# Patient Record
Sex: FEMALE | Race: WHITE | Employment: FULL TIME | URBAN - METROPOLITAN AREA
[De-identification: names, ages, dates, MRNs, and addresses within clinical notes are randomized per-mention and may not be internally consistent; named-entity substitution may affect disease eponyms.]

---

## 2018-03-23 ENCOUNTER — HOSPITAL ENCOUNTER (EMERGENCY)
Facility: HOSPITAL | Age: 38
Discharge: HOME | End: 2018-03-23
Payer: COMMERCIAL

## 2018-03-23 VITALS
DIASTOLIC BLOOD PRESSURE: 82 MMHG | BODY MASS INDEX: 23.6 KG/M2 | WEIGHT: 125 LBS | SYSTOLIC BLOOD PRESSURE: 117 MMHG | RESPIRATION RATE: 16 BRPM | HEART RATE: 74 BPM | HEIGHT: 61 IN | TEMPERATURE: 98 F | OXYGEN SATURATION: 100 %

## 2018-03-23 DIAGNOSIS — K64.9 HEMORRHOIDS, UNSPECIFIED HEMORRHOID TYPE: Primary | ICD-10-CM

## 2018-03-23 PROCEDURE — 99283 EMERGENCY DEPT VISIT LOW MDM: CPT

## 2018-03-23 RX ORDER — CLONAZEPAM 0.5 MG/1
0.5 TABLET ORAL 2 TIMES DAILY PRN
COMMUNITY
End: 2019-07-25

## 2018-03-23 RX ORDER — HYDROCODONE BITARTRATE AND ACETAMINOPHEN 5; 325 MG/1; MG/1
1 TABLET ORAL ONCE
Status: COMPLETED | OUTPATIENT
Start: 2018-03-23 | End: 2018-03-23

## 2018-03-23 RX ORDER — SERTRALINE HYDROCHLORIDE 100 MG/1
100 TABLET, FILM COATED ORAL DAILY
COMMUNITY
End: 2020-01-06

## 2018-03-23 RX ORDER — DOCUSATE SODIUM 100 MG/1
100 CAPSULE, LIQUID FILLED ORAL 2 TIMES DAILY
Qty: 60 CAPSULE | Refills: 0 | Status: SHIPPED | OUTPATIENT
Start: 2018-03-23 | End: 2018-04-22

## 2018-03-23 NOTE — ED PROVIDER NOTES
Patient Seen in: [facility] AND [facility] Emergency Department    History   Patient presents with:  Anal Problem (GI)    Stated Complaint: abdominal pain    HPI    42year old female presents to the emergency department complaining of constipation and rectal pa alert and oriented to person, place, and time. Skin: No rash noted. Nursing note and vitals reviewed.           ED Course   Labs Reviewed - No data to display    ED Course as of Mar 23 1438  ------------------------------------------------------------

## 2019-07-25 PROBLEM — F41.9 ANXIETY: Status: ACTIVE | Noted: 2019-07-25

## 2019-07-25 NOTE — PROGRESS NOTES
HPI    Patient presents to establish care. Wants to discuss options for refilling her anxiety medications. Currently sees a psychiatrist [address] a way and hoping to find someone closer to home. Takes sertraline and clonazepam as needed.   Only goes in Activity      Alcohol use:  Yes        Alcohol/week: 1.0 - 2.0 standard drinks        Types: 1 - 2 Glasses of wine per week        Frequency: Monthly or less        Drinks per session: 1 or 2        Binge frequency: Less than monthly      Drug use: Never adenopathy. Neurological: She is alert and oriented to person, place, and time. Skin: Skin is warm and dry. Psychiatric: She has a normal mood and affect.  Her behavior is normal. Judgment and thought content normal.       Assessment and Plan:  Proble

## 2019-07-31 ENCOUNTER — OFFICE VISIT (OUTPATIENT)
Dept: FAMILY MEDICINE CLINIC | Facility: CLINIC | Age: 39
End: 2019-07-31
Payer: COMMERCIAL

## 2019-07-31 ENCOUNTER — LAB ENCOUNTER (OUTPATIENT)
Dept: LAB | Age: 39
End: 2019-07-31
Attending: NURSE PRACTITIONER
Payer: COMMERCIAL

## 2019-07-31 ENCOUNTER — TELEPHONE (OUTPATIENT)
Dept: FAMILY MEDICINE CLINIC | Facility: CLINIC | Age: 39
End: 2019-07-31

## 2019-07-31 VITALS
SYSTOLIC BLOOD PRESSURE: 108 MMHG | TEMPERATURE: 99 F | HEART RATE: 77 BPM | BODY MASS INDEX: 27.23 KG/M2 | DIASTOLIC BLOOD PRESSURE: 82 MMHG | WEIGHT: 148 LBS | HEIGHT: 62 IN

## 2019-07-31 DIAGNOSIS — Z86.19 HISTORY OF INFECTION DUE TO HUMAN PAPILLOMA VIRUS (HPV): ICD-10-CM

## 2019-07-31 DIAGNOSIS — Z12.39 SCREENING FOR BREAST CANCER: ICD-10-CM

## 2019-07-31 DIAGNOSIS — Z01.419 WELL WOMAN EXAM WITH ROUTINE GYNECOLOGICAL EXAM: Primary | ICD-10-CM

## 2019-07-31 DIAGNOSIS — Z98.82 HISTORY OF BILATERAL BREAST IMPLANTS: ICD-10-CM

## 2019-07-31 DIAGNOSIS — Z01.419 WELL WOMAN EXAM WITH ROUTINE GYNECOLOGICAL EXAM: ICD-10-CM

## 2019-07-31 DIAGNOSIS — K59.09 OTHER CONSTIPATION: ICD-10-CM

## 2019-07-31 DIAGNOSIS — Z23 NEED FOR VACCINATION: ICD-10-CM

## 2019-07-31 LAB
25(OH)D3 SERPL-MCNC: 16.5 NG/ML (ref 30–100)
ALBUMIN SERPL-MCNC: 3.9 G/DL (ref 3.4–5)
ALBUMIN/GLOB SERPL: 1 {RATIO} (ref 1–2)
ALP LIVER SERPL-CCNC: 50 U/L (ref 37–98)
ALT SERPL-CCNC: 16 U/L (ref 13–56)
ANION GAP SERPL CALC-SCNC: 6 MMOL/L (ref 0–18)
AST SERPL-CCNC: 14 U/L (ref 15–37)
BASOPHILS # BLD AUTO: 0.04 X10(3) UL (ref 0–0.2)
BASOPHILS NFR BLD AUTO: 0.6 %
BILIRUB SERPL-MCNC: 0.4 MG/DL (ref 0.1–2)
BUN BLD-MCNC: 17 MG/DL (ref 7–18)
BUN/CREAT SERPL: 29.3 (ref 10–20)
CALCIUM BLD-MCNC: 9.3 MG/DL (ref 8.5–10.1)
CHLORIDE SERPL-SCNC: 106 MMOL/L (ref 98–112)
CHOLEST SMN-MCNC: 231 MG/DL (ref ?–200)
CO2 SERPL-SCNC: 27 MMOL/L (ref 21–32)
CREAT BLD-MCNC: 0.58 MG/DL (ref 0.55–1.02)
DEPRECATED RDW RBC AUTO: 43 FL (ref 35.1–46.3)
EOSINOPHIL # BLD AUTO: 0.22 X10(3) UL (ref 0–0.7)
EOSINOPHIL NFR BLD AUTO: 3.5 %
ERYTHROCYTE [DISTWIDTH] IN BLOOD BY AUTOMATED COUNT: 13.8 % (ref 11–15)
GLOBULIN PLAS-MCNC: 3.9 G/DL (ref 2.8–4.4)
GLUCOSE BLD-MCNC: 82 MG/DL (ref 70–99)
HCT VFR BLD AUTO: 39.8 % (ref 35–48)
HDLC SERPL-MCNC: 59 MG/DL (ref 40–59)
HGB BLD-MCNC: 13.1 G/DL (ref 12–16)
HPV I/H RISK 1 DNA SPEC QL NAA+PROBE: NEGATIVE
IMM GRANULOCYTES # BLD AUTO: 0.02 X10(3) UL (ref 0–1)
IMM GRANULOCYTES NFR BLD: 0.3 %
LDLC SERPL CALC-MCNC: 138 MG/DL (ref ?–100)
LYMPHOCYTES # BLD AUTO: 2.31 X10(3) UL (ref 1–4)
LYMPHOCYTES NFR BLD AUTO: 36.6 %
M PROTEIN MFR SERPL ELPH: 7.8 G/DL (ref 6.4–8.2)
MCH RBC QN AUTO: 28.1 PG (ref 26–34)
MCHC RBC AUTO-ENTMCNC: 32.9 G/DL (ref 31–37)
MCV RBC AUTO: 85.4 FL (ref 80–100)
MONOCYTES # BLD AUTO: 0.46 X10(3) UL (ref 0.1–1)
MONOCYTES NFR BLD AUTO: 7.3 %
NEUTROPHILS # BLD AUTO: 3.27 X10 (3) UL (ref 1.5–7.7)
NEUTROPHILS # BLD AUTO: 3.27 X10(3) UL (ref 1.5–7.7)
NEUTROPHILS NFR BLD AUTO: 51.7 %
NONHDLC SERPL-MCNC: 172 MG/DL (ref ?–130)
OSMOLALITY SERPL CALC.SUM OF ELEC: 289 MOSM/KG (ref 275–295)
PATIENT FASTING: YES
PATIENT FASTING: YES
PLATELET # BLD AUTO: 314 10(3)UL (ref 150–450)
POTASSIUM SERPL-SCNC: 4.2 MMOL/L (ref 3.5–5.1)
RBC # BLD AUTO: 4.66 X10(6)UL (ref 3.8–5.3)
SODIUM SERPL-SCNC: 139 MMOL/L (ref 136–145)
TRIGL SERPL-MCNC: 171 MG/DL (ref 30–149)
TSI SER-ACNC: 1.69 MIU/ML (ref 0.36–3.74)
VIT B12 SERPL-MCNC: 577 PG/ML (ref 193–986)
VLDLC SERPL CALC-MCNC: 34 MG/DL (ref 0–30)
WBC # BLD AUTO: 6.3 X10(3) UL (ref 4–11)

## 2019-07-31 PROCEDURE — 36415 COLL VENOUS BLD VENIPUNCTURE: CPT

## 2019-07-31 PROCEDURE — 90715 TDAP VACCINE 7 YRS/> IM: CPT | Performed by: NURSE PRACTITIONER

## 2019-07-31 PROCEDURE — 90471 IMMUNIZATION ADMIN: CPT | Performed by: NURSE PRACTITIONER

## 2019-07-31 PROCEDURE — 85025 COMPLETE CBC W/AUTO DIFF WBC: CPT

## 2019-07-31 PROCEDURE — 80061 LIPID PANEL: CPT

## 2019-07-31 PROCEDURE — 80053 COMPREHEN METABOLIC PANEL: CPT

## 2019-07-31 PROCEDURE — 84443 ASSAY THYROID STIM HORMONE: CPT

## 2019-07-31 PROCEDURE — 82607 VITAMIN B-12: CPT

## 2019-07-31 PROCEDURE — 82306 VITAMIN D 25 HYDROXY: CPT

## 2019-07-31 PROCEDURE — 99396 PREV VISIT EST AGE 40-64: CPT | Performed by: NURSE PRACTITIONER

## 2019-07-31 NOTE — PROGRESS NOTES
HPI    Patient presents for annual physical.  No complaints of health. No significant past medical history. Last pap - 3 years ago. Was abnormal.  Had colposcopy which was abnormal and then had scraping of cells after. Positive for history of HPV. curr  2016   • Design custom breast implant  2009       Family History   Problem Relation Age of Onset   • Stroke Father    • Other (Other) Mother    • Heart Disorder Maternal Grandmother    • Other (Other) Maternal Grandfather        Social History    Soc MG Oral Tab Take 1 tablet (0.5 mg total) by mouth nightly as needed for Anxiety. Disp: 30 tablet Rfl: 0   Sertraline HCl 100 MG Oral Tab Take 100 mg by mouth daily.  Disp:  Rfl:        Allergies:  No Known Allergies    Physical Exam   Nursing note and vital with pap/hpv, vaginal culture.          Relevant Orders    LIPID PANEL    COMP METABOLIC PANEL (14)    CBC WITH DIFFERENTIAL WITH PLATELET    VITAMIN M94    VITAMIN D, 25-HYDROXY    TSH W REFLEX TO FREE T4    THINPREP PAP SMEAR B    HPV HIGH RISK , THIN PRE

## 2019-07-31 NOTE — ASSESSMENT & PLAN NOTE
General labs with pap/hpv, vaginal culture. Discussed IUD with patient. To consider Mirena IUD and return.

## 2019-07-31 NOTE — TELEPHONE ENCOUNTER
1st -  her pap smear needs to be normal to proceed so needs that resulted before can have IUD placed. Please find out which kind she wants - Mirena -5 years with hormones vs paraguard - 10 years no hormones but heavier menses usually.  Should scheduled wh

## 2019-07-31 NOTE — TELEPHONE ENCOUNTER
Pt is interested in IUD placement with Dr Yolanda Tony, she is requesting instructions prior to getting IUD placement done.  Please advise

## 2019-07-31 NOTE — PATIENT INSTRUCTIONS
Treating Constipation    Constipation is a common and often uncomfortable problem. Constipation means you have bowel movements fewer than 3 times per week, or strain to pass hard, dry stool. It can last a short time.  Or it can be a problem that never see © 6581-2219 [address] [facility], [address] [place]. All rights reserved. This information is not intended as a substitute for professional medical care. Always follow your healthcare professional's instructions.         Prevent Cervical cancer All women in this age group, except women who have had a complete hysterectomy Pap test every 3 years or Pap test plus human papilloma virus (HPV) test every 5 years   Chlamydia Women at increased risk for infection At routine exams if you' Haemophilus influenzae Type B (HIB) Women at increased risk 1 to 3 doses   Influenza (flu) All women in this age group Once a year   Measles, mumps, rubella (MMR) All women in this age group who have no record of these infections or vaccines 1 or 2 doses

## 2019-08-01 DIAGNOSIS — E78.2 ELEVATED TRIGLYCERIDES WITH HIGH CHOLESTEROL: ICD-10-CM

## 2019-08-01 DIAGNOSIS — E55.9 VITAMIN D DEFICIENCY: Primary | ICD-10-CM

## 2019-08-01 RX ORDER — CHOLECALCIFEROL (VITAMIN D3) 50 MCG
1 TABLET ORAL DAILY
Qty: 1 TABLET | Refills: 0 | COMMUNITY
Start: 2019-08-01 | End: 2019-08-02

## 2019-08-01 NOTE — TELEPHONE ENCOUNTER
Patient called back and was informed. She will call us back once she decides. Per patient request the message was sent via [employer] for the patient to reread.

## 2019-08-03 LAB
GENITAL VAGINOSIS SCREEN: NEGATIVE
TRICHOMONAS SCREEN: NEGATIVE

## 2019-08-12 ENCOUNTER — TELEPHONE (OUTPATIENT)
Dept: FAMILY MEDICINE CLINIC | Facility: CLINIC | Age: 39
End: 2019-08-12

## 2019-08-12 NOTE — TELEPHONE ENCOUNTER
Pt returned call. Pt states she first noticed the area of redness on Monday 8/5/19, pt received TDaP on 7/31/19. The area of redness is about the size of a dime and is gradually getting bigger, now is a raised area that is noticeable under her clothing.  St

## 2019-09-24 NOTE — TELEPHONE ENCOUNTER
Controlled medication pending for review. If approved needs to be called in or faxed by on-site staff.     Requested Prescriptions     Pending Prescriptions Disp Refills   • clonazePAM 0.5 MG Oral Tab 30 tablet 0     Sig: Take 1 tablet (0.5 mg total) by mo

## 2019-09-25 RX ORDER — CLONAZEPAM 0.5 MG/1
0.5 TABLET ORAL NIGHTLY PRN
Qty: 30 TABLET | Refills: 0 | Status: SHIPPED
Start: 2019-09-25 | End: 2019-12-09

## 2019-09-25 NOTE — TELEPHONE ENCOUNTER
[address] [phone], spoke with pharmacist to dispense medication  Clonazepam 0.5 MG  To disp 30 tablets  With no refills  Directions: take one tablet by mouth nightly as needed for anxiety    Called patient left voicemail stating medication c

## 2019-12-10 RX ORDER — CLONAZEPAM 0.5 MG/1
0.5 TABLET ORAL NIGHTLY PRN
Qty: 30 TABLET | Refills: 0 | Status: SHIPPED | OUTPATIENT
Start: 2019-12-10 | End: 2020-02-20

## 2019-12-10 NOTE — TELEPHONE ENCOUNTER
Controlled medication pending for review. Please change to phone in, fax, or print script if not being sent electronically.     Requested Prescriptions     Pending Prescriptions Disp Refills   • clonazePAM 0.5 MG Oral Tab 30 tablet 0     Sig: Take 1 tablet

## 2020-01-06 ENCOUNTER — PATIENT MESSAGE (OUTPATIENT)
Dept: FAMILY MEDICINE CLINIC | Facility: CLINIC | Age: 40
End: 2020-01-06

## 2020-01-06 NOTE — TELEPHONE ENCOUNTER
From: Riky Linder  To: TIFFANIE Douglas  Sent: 1/6/2020 10:42 AM CST  Subject: Prescription Question    I am a new patient of Quiana Verde. She said that she will fill my Sertaline 100mg when I need it.  Can you have her submit a prescription for me

## 2020-01-07 RX ORDER — SERTRALINE HYDROCHLORIDE 100 MG/1
100 TABLET, FILM COATED ORAL DAILY
Qty: 90 TABLET | Refills: 1 | Status: SHIPPED | OUTPATIENT
Start: 2020-01-07 | End: 2020-08-10

## 2020-02-20 RX ORDER — CLONAZEPAM 0.5 MG/1
0.5 TABLET ORAL NIGHTLY PRN
Qty: 30 TABLET | Refills: 0 | Status: SHIPPED | OUTPATIENT
Start: 2020-02-20 | End: 2020-05-26

## 2020-02-20 NOTE — TELEPHONE ENCOUNTER
•  clonazePAM 0.5 MG Oral Tab, Take 1 tablet (0.5 mg total) by mouth nightly as needed for Anxiety. , Disp: 30 tablet, Rfl: 0

## 2020-02-20 NOTE — TELEPHONE ENCOUNTER
Controlled medication pending for review. Please change to phone in, fax, or print script if not being sent electronically.     Last Rx: 12/10/19  LOV: 07/31/19

## 2020-05-26 RX ORDER — CLONAZEPAM 0.5 MG/1
0.5 TABLET ORAL NIGHTLY PRN
Qty: 30 TABLET | Refills: 0 | Status: SHIPPED | OUTPATIENT
Start: 2020-05-26 | End: 2020-08-10

## 2020-08-10 RX ORDER — CLONAZEPAM 0.5 MG/1
0.5 TABLET ORAL NIGHTLY PRN
Qty: 10 TABLET | Refills: 0 | Status: SHIPPED | OUTPATIENT
Start: 2020-08-10 | End: 2020-09-18

## 2020-08-10 RX ORDER — SERTRALINE HYDROCHLORIDE 100 MG/1
100 TABLET, FILM COATED ORAL DAILY
Qty: 30 TABLET | Refills: 0 | Status: SHIPPED | OUTPATIENT
Start: 2020-08-10 | End: 2020-09-18

## 2020-08-31 RX ORDER — SERTRALINE HYDROCHLORIDE 100 MG/1
100 TABLET, FILM COATED ORAL DAILY
Qty: 30 TABLET | Refills: 0 | OUTPATIENT
Start: 2020-08-31

## 2020-08-31 RX ORDER — CLONAZEPAM 0.5 MG/1
0.5 TABLET ORAL NIGHTLY PRN
Qty: 10 TABLET | Refills: 0 | OUTPATIENT
Start: 2020-08-31

## 2020-09-02 RX ORDER — CLONAZEPAM 0.5 MG/1
TABLET ORAL
Qty: 10 TABLET | Refills: 0 | OUTPATIENT
Start: 2020-09-02

## 2020-09-18 NOTE — PROGRESS NOTES
HPI    Patient presents for anxiety follow up. Well controlled with sertraline and clonazepam as needed. Review of Systems   Psychiatric/Behavioral: Negative for suicidal ideas and self-injury. The patient is nervous/anxious.     All other systems re Frequency: Monthly or less        Drinks per session: 1 or 2        Binge frequency: Less than monthly      Drug use: Never      Sexual activity: Not on file    Lifestyle      Physical activity:        Days per week: Not on file        Minutes per session: Plan:  Problem List Items Addressed This Visit        Other    Anxiety - Primary    Relevant Medications    clonazePAM 0.5 MG Oral Tab    Sertraline HCl 100 MG Oral Tab      Discussed plan of care with patient and patient is in agreement.   All questions an

## 2020-12-10 DIAGNOSIS — F41.9 ANXIETY: ICD-10-CM

## 2020-12-10 RX ORDER — CLONAZEPAM 0.5 MG/1
TABLET ORAL
Qty: 30 TABLET | Refills: 0 | Status: SHIPPED | OUTPATIENT
Start: 2020-12-10 | End: 2021-03-09

## 2021-03-02 ENCOUNTER — TELEPHONE (OUTPATIENT)
Dept: FAMILY MEDICINE CLINIC | Facility: CLINIC | Age: 41
End: 2021-03-02

## 2021-03-06 DIAGNOSIS — F41.9 ANXIETY: ICD-10-CM

## 2021-03-09 DIAGNOSIS — F41.9 ANXIETY: ICD-10-CM

## 2021-03-09 RX ORDER — CLONAZEPAM 0.5 MG/1
0.5 TABLET ORAL NIGHTLY PRN
Qty: 30 TABLET | Refills: 0 | OUTPATIENT
Start: 2021-03-09

## 2021-03-09 RX ORDER — CLONAZEPAM 0.5 MG/1
0.5 TABLET ORAL NIGHTLY PRN
Qty: 30 TABLET | Refills: 0 | Status: SHIPPED | OUTPATIENT
Start: 2021-03-09 | End: 2021-05-17

## 2021-03-13 DIAGNOSIS — Z12.31 ENCOUNTER FOR SCREENING MAMMOGRAM FOR MALIGNANT NEOPLASM OF BREAST: Primary | ICD-10-CM

## 2021-04-20 DIAGNOSIS — F41.9 ANXIETY: ICD-10-CM

## 2021-04-20 RX ORDER — SERTRALINE HYDROCHLORIDE 100 MG/1
TABLET, FILM COATED ORAL
Qty: 90 TABLET | Refills: 1 | Status: SHIPPED | OUTPATIENT
Start: 2021-04-20 | End: 2021-07-22

## 2021-05-16 DIAGNOSIS — F41.9 ANXIETY: ICD-10-CM

## 2021-05-17 DIAGNOSIS — F41.9 ANXIETY: ICD-10-CM

## 2021-05-17 RX ORDER — CLONAZEPAM 0.5 MG/1
0.5 TABLET ORAL NIGHTLY PRN
Qty: 30 TABLET | Refills: 0 | Status: SHIPPED | OUTPATIENT
Start: 2021-05-17 | End: 2021-07-22

## 2021-05-18 RX ORDER — CLONAZEPAM 0.5 MG/1
0.5 TABLET ORAL NIGHTLY PRN
Qty: 30 TABLET | Refills: 0 | OUTPATIENT
Start: 2021-05-18

## 2021-07-22 NOTE — PROGRESS NOTES
HPI    Patient presents for anxiety follow up and medication refills. Well controlled with daily sertraline and as needed clonazepam.  Also with concerns of large cold sore to upper lip x 3 days. Review of Systems   HENT: Positive for mouth sores. activity: Not on file    Other Topics      Concerns:        Not on file    Social History Narrative      Not on file    Social Determinants of Health  Financial Resource Strain:       Difficulty of Paying Living Expenses:   Food Insecurity:       Worried A No wheezing, rhonchi or rales. Chest:      Chest wall: No tenderness. Skin:     General: Skin is warm and dry. Neurological:      Mental Status: She is alert and oriented to person, place, and time.    Psychiatric:         Mood and Affect: Mood normal

## 2021-11-06 DIAGNOSIS — F41.9 ANXIETY: ICD-10-CM

## 2021-11-06 RX ORDER — CLONAZEPAM 0.5 MG/1
0.5 TABLET ORAL NIGHTLY PRN
Qty: 30 TABLET | Refills: 0 | Status: SHIPPED | OUTPATIENT
Start: 2021-11-06 | End: 2022-01-10

## 2021-11-06 NOTE — TELEPHONE ENCOUNTER
Please review; protocol failed/no protocol    Requested Prescriptions   Pending Prescriptions Disp Refills    CLONAZEPAM 0.5 MG Oral Tab [Pharmacy Med Name: CLONAZEPAM 0.5 MG TABLET] 30 tablet 0     Sig: TAKE 1 TABLET BY MOUTH NIGHTLY AS NEEDED FOR ANXIETY

## 2022-01-10 DIAGNOSIS — B00.1 HERPES LABIALIS: ICD-10-CM

## 2022-01-10 DIAGNOSIS — F41.9 ANXIETY: ICD-10-CM

## 2022-01-11 RX ORDER — VALACYCLOVIR HYDROCHLORIDE 1 G/1
TABLET, FILM COATED ORAL
Qty: 6 TABLET | Refills: 2 | Status: SHIPPED | OUTPATIENT
Start: 2022-01-11

## 2022-01-11 RX ORDER — CLONAZEPAM 0.5 MG/1
0.5 TABLET ORAL NIGHTLY PRN
Qty: 30 TABLET | Refills: 0 | Status: SHIPPED | OUTPATIENT
Start: 2022-01-11

## 2022-03-02 ENCOUNTER — HOSPITAL ENCOUNTER (OUTPATIENT)
Dept: MAMMOGRAPHY | Age: 42
Discharge: HOME | End: 2022-03-02
Attending: NURSE PRACTITIONER
Payer: COMMERCIAL

## 2022-03-02 DIAGNOSIS — Z12.31 ENCOUNTER FOR SCREENING MAMMOGRAM FOR MALIGNANT NEOPLASM OF BREAST: ICD-10-CM

## 2022-03-02 PROCEDURE — 77067 SCR MAMMO BI INCL CAD: CPT | Performed by: NURSE PRACTITIONER

## 2022-03-02 PROCEDURE — 77063 BREAST TOMOSYNTHESIS BI: CPT | Performed by: NURSE PRACTITIONER

## 2022-04-20 ENCOUNTER — HOSPITAL ENCOUNTER (OUTPATIENT)
Dept: MAMMOGRAPHY | Facility: HOSPITAL | Age: 42
Discharge: HOME | End: 2022-04-20
Attending: NURSE PRACTITIONER
Payer: COMMERCIAL

## 2022-04-20 DIAGNOSIS — R92.2 INCONCLUSIVE MAMMOGRAM: ICD-10-CM

## 2022-04-20 PROCEDURE — 77065 DX MAMMO INCL CAD UNI: CPT | Performed by: NURSE PRACTITIONER

## 2022-04-20 PROCEDURE — 77061 BREAST TOMOSYNTHESIS UNI: CPT | Performed by: NURSE PRACTITIONER

## 2022-04-29 RX ORDER — SERTRALINE HYDROCHLORIDE 100 MG/1
100 TABLET, FILM COATED ORAL DAILY
Qty: 90 TABLET | Refills: 0 | Status: SHIPPED | OUTPATIENT
Start: 2022-04-29

## 2022-04-29 NOTE — TELEPHONE ENCOUNTER
90 day refill given on 04/29/22, appointment needed for further refills. Refill passed per [facility] protocol. Requested Prescriptions   Pending Prescriptions Disp Refills    sertraline 100 MG Oral Tab 90 tablet 1     Sig: Take 1 tablet (100 mg total) by mouth daily. Psychiatric Non-Scheduled (Anti-Anxiety) Failed - 4/29/2022  3:28 PM        Failed - Appointment in last 6 or next 3 months           valACYclovir 1 G Oral Tab 6 tablet 2     Sig: Take one tablet now and another in 12 hours with cold sore flare up. There is no refill protocol information for this order        clonazePAM 0.5 MG Oral Tab 30 tablet 0     Sig: Take 1 tablet (0.5 mg total) by mouth nightly as needed for Anxiety.         There is no refill protocol information for this order           Recent Outpatient Visits              9 months ago Elly [address], [facility], [address] [facility], APRN    Office Visit    1 year ago [facility], [facility] [address] [facility], [address]    Office Visit    2 years ago Well woman exam with routine gynecological exam    Lauren Hernandez, [address] [facility], APRN    Office Visit    2 years ago [address], 1 [facility] Lana Jenkins, [address]    Office Visit

## 2022-04-29 NOTE — TELEPHONE ENCOUNTER
Please review. Protocol Failed or has no Protocol  Requested Prescriptions   Pending Prescriptions Disp Refills    valACYclovir 1 G Oral Tab 6 tablet 2     Sig: Take one tablet now and another in 12 hours with cold sore flare up. There is no refill protocol information for this order        clonazePAM 0.5 MG Oral Tab 30 tablet 0     Sig: Take 1 tablet (0.5 mg total) by mouth nightly as needed for Anxiety. There is no refill protocol information for this order       Signed Prescriptions Disp Refills    sertraline 100 MG Oral Tab 90 tablet 0     Sig: Take 1 tablet (100 mg total) by mouth daily. 90 day refill given on 04/29/22, appointment needed for further refills.         Psychiatric Non-Scheduled (Anti-Anxiety) Failed - 4/29/2022  3:28 PM        Failed - Appointment in last 6 or next 3 months              Recent Outpatient Visits              9 months ago [address], [facility], [facility], APRN    Office Visit    1 year ago Elly [address], [address], [address] [facility], [address]    Office Visit    2 years ago Well woman exam with routine gynecological exam    [address], [address] [facility], APRN    Office Visit    2 years ago [address], [address] [place] [address] Lana Jenkins, [address]    Office Visit

## 2022-05-01 RX ORDER — CLONAZEPAM 0.5 MG/1
0.5 TABLET ORAL NIGHTLY PRN
Qty: 30 TABLET | Refills: 0 | Status: SHIPPED | OUTPATIENT
Start: 2022-05-01

## 2022-05-01 RX ORDER — VALACYCLOVIR HYDROCHLORIDE 1 G/1
TABLET, FILM COATED ORAL
Qty: 6 TABLET | Refills: 2 | Status: SHIPPED | OUTPATIENT
Start: 2022-05-01

## 2022-05-02 ENCOUNTER — HOSPITAL ENCOUNTER (OUTPATIENT)
Age: 42
Discharge: HOME | End: 2022-05-02
Payer: COMMERCIAL

## 2022-05-02 VITALS
OXYGEN SATURATION: 98 % | WEIGHT: 140 LBS | HEART RATE: 77 BPM | TEMPERATURE: 99 F | HEIGHT: 61 IN | BODY MASS INDEX: 26.43 KG/M2 | DIASTOLIC BLOOD PRESSURE: 62 MMHG | SYSTOLIC BLOOD PRESSURE: 107 MMHG | RESPIRATION RATE: 16 BRPM

## 2022-05-02 DIAGNOSIS — U07.1 COVID-19: Primary | ICD-10-CM

## 2022-05-02 LAB
POCT INFLUENZA A: NEGATIVE
POCT INFLUENZA B: NEGATIVE
SARS-COV-2 RNA RESP QL NAA+PROBE: DETECTED

## 2022-05-02 PROCEDURE — U0002 COVID-19 LAB TEST NON-CDC: HCPCS | Performed by: NURSE PRACTITIONER

## 2022-05-02 PROCEDURE — 87502 INFLUENZA DNA AMP PROBE: CPT | Performed by: NURSE PRACTITIONER

## 2022-05-02 PROCEDURE — M0222 INTRAVENOUS INJECTION, BEBTELOVIMAB, INCLUDES INJECTION AND POST ADMINISTRATIVE MONITORING: HCPCS | Performed by: NURSE PRACTITIONER

## 2022-05-02 PROCEDURE — 99214 OFFICE O/P EST MOD 30 MIN: CPT | Performed by: NURSE PRACTITIONER

## 2022-05-02 RX ORDER — BEBTELOVIMAB 87.5 MG/ML
175 INJECTION, SOLUTION INTRAVENOUS ONCE
Status: COMPLETED | OUTPATIENT
Start: 2022-05-02 | End: 2022-05-02

## 2022-05-02 NOTE — ED INITIAL ASSESSMENT (HPI)
Patient presents to IC with c/o body aches, headache, nasal congestion beginning Saturday night. Denies fevers, cough or sore throat. No hx of covid in past 3 months.

## 2022-07-31 DIAGNOSIS — F41.9 ANXIETY: ICD-10-CM

## 2022-08-02 RX ORDER — SERTRALINE HYDROCHLORIDE 100 MG/1
TABLET, FILM COATED ORAL
Qty: 90 TABLET | Refills: 0 | OUTPATIENT
Start: 2022-08-02

## 2022-09-20 NOTE — TELEPHONE ENCOUNTER
----- Message from [facility] sent at 8/12/2019 10:19 AM CDT -----  Regarding: Non-Urgent Medical Question  Contact: [phone]  I received a tetnus shot on 7/31 and the injection site is red and raised. Only painful if touched.  No puss, no bleed Dr. Hernandez

## 2022-10-14 DIAGNOSIS — F41.9 ANXIETY: ICD-10-CM

## 2022-10-14 DIAGNOSIS — B00.1 HERPES LABIALIS: ICD-10-CM

## 2022-10-14 RX ORDER — SERTRALINE HYDROCHLORIDE 100 MG/1
100 TABLET, FILM COATED ORAL DAILY
Qty: 90 TABLET | Refills: 1 | Status: SHIPPED | OUTPATIENT
Start: 2022-10-14 | End: 2022-10-18

## 2022-10-14 RX ORDER — VALACYCLOVIR HYDROCHLORIDE 1 G/1
TABLET, FILM COATED ORAL
Qty: 4 TABLET | Refills: 0 | Status: SHIPPED | OUTPATIENT
Start: 2022-10-14 | End: 2022-10-18

## 2022-10-14 RX ORDER — CLONAZEPAM 0.5 MG/1
0.5 TABLET ORAL NIGHTLY PRN
Qty: 30 TABLET | Refills: 0 | Status: SHIPPED | OUTPATIENT
Start: 2022-10-14 | End: 2023-03-20

## 2022-10-14 NOTE — TELEPHONE ENCOUNTER
Routed to [employer] PA for [employer] TIFFANIE for advise, thanks. New pharm updated. Refill passed per [facility] protocol   Requested Prescriptions   Pending Prescriptions Disp Refills    clonazePAM 0.5 MG Oral Tab 30 tablet 0     Sig: Take 1 tablet (0.5 mg total) by mouth nightly as needed for Anxiety. There is no refill protocol information for this order        sertraline 100 MG Oral Tab 90 tablet 1     Sig: Take 1 tablet (100 mg total) by mouth daily. Psychiatric Non-Scheduled (Anti-Anxiety) Passed - 10/14/2022  8:14 AM        Passed - In person appointment or virtual visit in the past 6 mos or appointment in next 3 mos       Recent Outpatient Visits              2 months ago [address], [address] [facility], [facility]    Office Visit    1 year ago Elly Beaulieu, [place] , 1 [facility] Izaiah Jenkins, TIFFANIE    Office Visit    2 years ago Elly Beaulieu, [address], 1 [facility] Izaiah Jenkins, [address] [place] [address] Drive    Office Visit    3 years ago Well woman exam with routine gynecological exam    [facility], [facility] Izaiah Jenkins, APRRON    Office Visit    3 years ago Elly Beaulieu, [address], [facility], [facility]    Office Visit                    valACYclovir 1 G Oral Tab 6 tablet 2     Sig: Take one tablet now and another in 12 hours with cold sore flare up.         There is no refill protocol information for this order

## 2022-10-18 DIAGNOSIS — B00.1 HERPES LABIALIS: ICD-10-CM

## 2022-10-18 DIAGNOSIS — F41.9 ANXIETY: ICD-10-CM

## 2022-10-18 RX ORDER — CLONAZEPAM 0.5 MG/1
0.5 TABLET ORAL NIGHTLY PRN
Qty: 30 TABLET | Refills: 0 | OUTPATIENT
Start: 2022-10-18

## 2022-10-18 RX ORDER — VALACYCLOVIR HYDROCHLORIDE 1 G/1
TABLET, FILM COATED ORAL
Qty: 4 TABLET | Refills: 0 | Status: SHIPPED | OUTPATIENT
Start: 2022-10-18

## 2022-10-18 NOTE — TELEPHONE ENCOUNTER
Patient requesting to new pharmacy. Cancelled at previous pharmacy. Pended for review if okay to resent to new patient preferred pharmacy.

## 2022-10-19 DIAGNOSIS — F41.9 ANXIETY: ICD-10-CM

## 2022-10-19 RX ORDER — CLONAZEPAM 0.5 MG/1
TABLET ORAL
Qty: 30 TABLET | Refills: 0 | OUTPATIENT
Start: 2022-10-19

## 2022-10-19 NOTE — TELEPHONE ENCOUNTER
Advised patient of Lory Burgess's note. Patient verbalized understanding and updated pharmacies in patient's profile.

## 2022-10-20 RX ORDER — SERTRALINE HYDROCHLORIDE 100 MG/1
100 TABLET, FILM COATED ORAL DAILY
Qty: 90 TABLET | Refills: 1 | Status: SHIPPED | OUTPATIENT
Start: 2022-10-20 | End: 2022-10-20

## 2022-10-20 RX ORDER — SERTRALINE HYDROCHLORIDE 100 MG/1
100 TABLET, FILM COATED ORAL DAILY
Qty: 90 TABLET | Refills: 1 | Status: SHIPPED | OUTPATIENT
Start: 2022-10-20

## 2023-09-18 DIAGNOSIS — F41.9 ANXIETY: ICD-10-CM

## 2023-09-18 NOTE — TELEPHONE ENCOUNTER
Per pharmacy pt is requesting refills for the following medications. clonazePAM 0.5 MG Oral Tab, Take 1 tablet (0.5 mg total) by mouth nightly as needed for Anxiety. , Disp: 30 tablet, Rfl: 0      sertraline 100 MG Oral Tab, Take 1 tablet (100 mg total) by mouth daily. , Disp: 90 tablet, Rfl: 1

## 2023-09-19 RX ORDER — CLONAZEPAM 0.5 MG/1
0.5 TABLET ORAL NIGHTLY PRN
Qty: 30 TABLET | Refills: 0 | OUTPATIENT
Start: 2023-09-19

## 2023-09-19 RX ORDER — SERTRALINE HYDROCHLORIDE 100 MG/1
100 TABLET, FILM COATED ORAL DAILY
Qty: 90 TABLET | Refills: 1 | OUTPATIENT
Start: 2023-09-19

## 2023-09-19 NOTE — TELEPHONE ENCOUNTER
Please review. Protocol failed or has no protocol. Requested Prescriptions   Pending Prescriptions Disp Refills    clonazePAM 0.5 MG Oral Tab 30 tablet 0     Sig: Take 1 tablet (0.5 mg total) by mouth nightly as needed for Anxiety. There is no refill protocol information for this order       sertraline 100 MG Oral Tab 90 tablet 1     Sig: Take 1 tablet (100 mg total) by mouth daily.        Psychiatric Non-Scheduled (Anti-Anxiety) Failed - 9/18/2023  2:37 PM        Failed - In person appointment or virtual visit in the past 6 mos or appointment in next 3 mos     Recent Outpatient Visits              1 year ago [facility], [address] [place] [address], APRN    Office Visit    2 years ago [facility], [address] [place] [address], APRN    Office Visit    3 years ago [facility], [address] [place] [address], APRN    Office Visit    4 years ago Well woman exam with routine gynecological exam    [address] [facility], 1 [facility] Lana Jenkins APRN    Office Visit    4 years ago [facility], [address] [place] [address], APRN    Office Visit                           Recent Outpatient Visits              1 year ago [facility], [address] [place] [address], APRN    Office Visit    2 years ago [address], [address], [address] [facility] Lana Jenkins, TIFFANIE    Office Visit    3 years ago [address], [address] [facility], APRN    Office Visit    4 years ago Well woman exam with routine gynecological exam    [address] [facility], 1 [facility] Lana Jenkins APRN    Office Visit    4 years ago [address], [address], 1 [facility] Lana Jenkins APRRON    Office Visit

## 2023-09-22 DIAGNOSIS — F41.9 ANXIETY: ICD-10-CM

## 2023-09-22 RX ORDER — CLONAZEPAM 0.5 MG/1
0.5 TABLET ORAL NIGHTLY PRN
Qty: 10 TABLET | Refills: 0 | Status: SHIPPED | OUTPATIENT
Start: 2023-09-22

## 2023-09-22 NOTE — TELEPHONE ENCOUNTER
Please review; protocol failed. Please see patients [address]  P Em Triage Support (supporting TIFFANIE Che)2 minutes ago (1:51 PM)       Thank you, I just scheduled my appointment for October 4th at 7:20am.  If she can refill half of it now, I would appreciate it  Please see message below for upcoming appointment. Future Appointments   Date Time Provider Jed Souza   10/4/2023  7:20 AM [facility]           Requested Prescriptions   Pending Prescriptions Disp Refills    clonazePAM 0.5 MG Oral Tab 30 tablet 0     Sig: Take 1 tablet (0.5 mg total) by mouth nightly as needed for Anxiety.        There is no refill protocol information for this order        Recent Outpatient Visits              1 year ago [address] [place] [address], [address] [place] [address], [facility]    Office Visit    2 years ago [address] [place] [address], [address] [place] [address], APRN    Office Visit    3 years ago [address] [place] [address], [address] [place] [address], APRN    Office Visit    4 years ago Well woman exam with routine gynecological exam    [facility], [address] [place] [address] Lana Jenkins APRN    Office Visit    4 years ago [address] [place] [address] Lana Jenkins, [address]    Office Visit

## 2024-01-15 DIAGNOSIS — F41.9 ANXIETY: ICD-10-CM

## 2024-01-15 NOTE — TELEPHONE ENCOUNTER
Rx pended see msg below    Pharmacy calling regarding:  clonazePAM 0.5 MG Oral Tab       Patient brought in a physical copy, this must be sent electronically

## 2024-01-15 NOTE — TELEPHONE ENCOUNTER
Pharmacy calling regarding:  clonazePAM 0.5 MG Oral Tab      Patient brought in a physical copy, this must be sent electronically

## 2024-01-17 RX ORDER — CLONAZEPAM 0.5 MG/1
0.5 TABLET ORAL NIGHTLY PRN
Qty: 30 TABLET | Refills: 0 | OUTPATIENT
Start: 2024-01-17

## 2024-01-17 NOTE — TELEPHONE ENCOUNTER
Called [facility], they can accept hard copies of clonazepam, called [facility], they are now able to accept hard copies, they said they would have it ready in 2 hours for patient pickup.  Please call patient to let her know.  Thank you     This rx request can be refused after patient is called.

## 2024-01-17 NOTE — TELEPHONE ENCOUNTER
Routed to Triage support for assistance, pls call pharm if pt still need rx ref?   Per med list printed rx given on 1-10-24 for 30 pills.  Thanks.

## 2024-04-14 DIAGNOSIS — B00.1 HERPES LABIALIS: ICD-10-CM

## 2024-04-14 DIAGNOSIS — F41.9 ANXIETY: ICD-10-CM

## 2024-04-15 RX ORDER — VALACYCLOVIR HYDROCHLORIDE 1 G/1
TABLET, FILM COATED ORAL
Qty: 4 TABLET | Refills: 1 | Status: SHIPPED | OUTPATIENT
Start: 2024-04-15

## 2024-04-15 RX ORDER — CLONAZEPAM 0.5 MG/1
0.5 TABLET ORAL NIGHTLY PRN
Qty: 30 TABLET | Refills: 0 | Status: SHIPPED | OUTPATIENT
Start: 2024-04-15

## 2024-04-15 NOTE — TELEPHONE ENCOUNTER
Please review; protocol failed/No Protocol    LOV:01/10/2024    Fill dates: 01/17/2024    Requested Prescriptions   Pending Prescriptions Disp Refills    clonazePAM 0.5 MG Oral Tab 30 tablet 0     Sig: Take 1 tablet (0.5 mg total) by mouth nightly as needed for Anxiety.       Controlled Substance Medication Failed - 4/14/2024  9:03 AM        Failed - This medication is a controlled substance - forward to provider to refill         Signed Prescriptions Disp Refills    valACYclovir 1 G Oral Tab 4 tablet 1     Sig: Take one tablet now and another in 12 hours with cold sore flare up.       Herpes Agent Protocol Passed - 4/14/2024  9:03 AM        Passed - In person appointment or virtual visit in the past 12 mos or appointment in next 3 mos     Recent Outpatient Visits              3 months ago Anxiety    [facility], [address], Tory Avina, TIFFANIE    Office Visit    1 year ago Anxiety    [facility], [address], Tory Avina, APRN    Office Visit    2 years ago Anxiety    [facility], [address], Tory Avina, APRRON    Office Visit    3 years ago Anxiety    [facility], [address], Tory Avina, APRN    Office Visit    4 years ago Well woman exam with routine gynecological exam    [facility], [address], Tory Avina, APRN    Office Visit                           Recent Outpatient Visits              3 months ago Anxiety    [facility], [address], Tory Avina, APRN    Office Visit    1 year ago Anxiety    [facility], [address], Tory Avina, APRN    Office Visit    2 years ago Anxiety    [facility], [address] APRN    Office Visit    3 years ago Anxiety    [facility], [address], APRN    Office Visit    4 years ago Well woman exam with routine  gynecological exam    [facility], [address], [place] Tory Holden, APRN    Office Visit

## 2024-04-15 NOTE — TELEPHONE ENCOUNTER
Refill passed per [facility] protocol.  Requested Prescriptions   Pending Prescriptions Disp Refills    clonazePAM 0.5 MG Oral Tab 30 tablet 0     Sig: Take 1 tablet (0.5 mg total) by mouth nightly as needed for Anxiety.       Controlled Substance Medication Failed - 4/14/2024  9:03 AM        Failed - This medication is a controlled substance - forward to provider to refill          valACYclovir 1 G Oral Tab 4 tablet 1     Sig: Take one tablet now and another in 12 hours with cold sore flare up.       Herpes Agent Protocol Passed - 4/14/2024  9:03 AM        Passed - In person appointment or virtual visit in the past 12 mos or appointment in next 3 mos     Recent Outpatient Visits              3 months ago Anxiety    [facility], [address], Tory Avina, APRN    Office Visit    1 year ago Anxiety    [facility], [address], Tory Avina, APRN    Office Visit    2 years ago Anxiety    [facility], [address], Tory Avina, APRN    Office Visit    3 years ago Anxiety    [facility], [address], Tory Avina, APRN    Office Visit    4 years ago Well woman exam with routine gynecological exam    [facility], [address], Tory Avina, APRN    Office Visit                           Recent Outpatient Visits              3 months ago Anxiety    [facility], [address], Tory Avina, APRN    Office Visit    1 year ago Anxiety    [facility], [address], Tory Avina, APRN    Office Visit    2 years ago Anxiety    [facility], [address], Tory Avina, APRN    Office Visit    3 years ago Anxiety    [facility], [address], Tory Avina, APRN    Office Visit    4 years ago Well woman exam with routine gynecological exam    [facility], [address],  TIFFANIE Spears    Office Visit

## 2024-07-11 DIAGNOSIS — F41.9 ANXIETY: ICD-10-CM

## 2024-07-15 RX ORDER — CLONAZEPAM 0.5 MG/1
0.5 TABLET ORAL NIGHTLY PRN
Qty: 30 TABLET | Refills: 0 | Status: SHIPPED | OUTPATIENT
Start: 2024-07-15

## 2024-07-15 NOTE — TELEPHONE ENCOUNTER
Please review. Protocol Failed; No Protocol      Recent fills: 4/16/2024  Last Rx written: 4/15/2024  Last office visit: 1/10/2024          Requested Prescriptions   Pending Prescriptions Disp Refills    clonazePAM 0.5 MG Oral Tab 30 tablet 0     Sig: Take 1 tablet (0.5 mg total) by mouth nightly as needed for Anxiety.       Controlled Substance Medication Failed - 7/11/2024  8:50 AM        Failed - This medication is a controlled substance - forward to provider to refill                 Recent Outpatient Visits              6 months ago Anxiety    [facility], [address], Tory Avina APRN    Office Visit    1 year ago Anxiety    [facility], [address], Tory Avina APRN    Office Visit    2 years ago Anxiety    [facility], [address], Tory Avina APRN    Office Visit    3 years ago Anxiety    [facility], [address], Tory Avina APRN    Office Visit    4 years ago Well woman exam with routine gynecological exam    [facility] [address] [place] Diaz Joanna, APRN    Office Visit

## 2024-10-01 DIAGNOSIS — F41.9 ANXIETY: ICD-10-CM

## 2024-10-02 DIAGNOSIS — F41.9 ANXIETY: ICD-10-CM

## 2024-10-02 NOTE — TELEPHONE ENCOUNTER
Pharmacy requesting refill      sertraline 100 MG Oral Tab, Take 1 tablet (100 mg total) by mouth daily., Disp: 90 tablet, Rfl: 1

## 2024-10-03 RX ORDER — CLONAZEPAM 0.5 MG/1
0.5 TABLET ORAL NIGHTLY PRN
Qty: 30 TABLET | Refills: 0 | Status: SHIPPED | OUTPATIENT
Start: 2024-10-03

## 2024-10-03 NOTE — TELEPHONE ENCOUNTER
Please review. Protocol Failed; No Protocol      Recent fills: 4/16/2024, 8/1/2024  Last Rx written: 7/15/2024  Last office visit: 1/10/2024          Requested Prescriptions   Pending Prescriptions Disp Refills    CLONAZEPAM 0.5 MG Oral Tab [Pharmacy Med Name: clonazePAM 0.5 MG Oral Tablet] 30 tablet 0     Sig: TAKE 1 TABLET BY MOUTH NIGHTLY AS NEEDED FOR ANXIETY       Controlled Substance Medication Failed - 10/1/2024  6:25 AM        Failed - This medication is a controlled substance - forward to provider to refill                 Recent Outpatient Visits              8 months ago Anxiety    [facility], [address], Tory Avina APRN    Office Visit    2 years ago Anxiety    [facility] [address]    Office Visit    3 years ago Anxiety    [facility], [address], Tory Avina APRN    Office Visit    4 years ago Anxiety    [facility], [address], Tory Avnia APRN    Office Visit    5 years ago Well woman exam with routine gynecological exam    [facility] [address] [facility]    Office Visit

## 2024-10-04 RX ORDER — SERTRALINE HYDROCHLORIDE 100 MG/1
100 TABLET, FILM COATED ORAL DAILY
Qty: 90 TABLET | Refills: 0 | Status: SHIPPED | OUTPATIENT
Start: 2024-10-04

## 2024-10-04 NOTE — TELEPHONE ENCOUNTER
Please review; protocol failed    No future appointments.  Last office visit: 1/10/2024    Requested Prescriptions   Pending Prescriptions Disp Refills    sertraline 100 MG Oral Tab 90 tablet 1     Sig: Take 1 tablet (100 mg total) by mouth daily.       Psychiatric Non-Scheduled (Anti-Anxiety) Failed - 10/2/2024  9:50 AM        Failed - In person appointment or virtual visit in the past 6 mos or appointment in next 3 mos     Recent Outpatient Visits              8 months ago Anxiety    [facility], [address], Tory Avina, APRRON    Office Visit    2 years ago Anxiety    [facility] [address]    Office Visit    3 years ago Anxiety    [facility], [address], Tory Avina, APRN    Office Visit    4 years ago Anxiety    [facility], [address], Tory Avina, APRN    Office Visit    5 years ago Well woman exam with routine gynecological exam    [facility], [address], Tory Avina, APRN    Office Visit                      Failed - Depression Screening completed within the past 12 months               Recent Outpatient Visits              8 months ago Anxiety    [facility], [address], Tory Avina, APRN    Office Visit    2 years ago Anxiety    [facility], [address], Tory Avina, APRN    Office Visit    3 years ago Anxiety    [facility], [address], Tory Avina, APRN    Office Visit    4 years ago Anxiety    [facility], [address], Tory Avina, APRN    Office Visit    5 years ago Well woman exam with routine gynecological exam    [facility] [address] [facility] [facility]    Office Visit

## 2024-10-23 ENCOUNTER — LAB ENCOUNTER (OUTPATIENT)
Dept: LAB | Age: 44
End: 2024-10-23
Attending: NURSE PRACTITIONER
Payer: COMMERCIAL

## 2024-10-23 ENCOUNTER — OFFICE VISIT (OUTPATIENT)
Dept: FAMILY MEDICINE CLINIC | Facility: CLINIC | Age: 44
End: 2024-10-23
Payer: COMMERCIAL

## 2024-10-23 VITALS
BODY MASS INDEX: 25.28 KG/M2 | WEIGHT: 137.38 LBS | HEART RATE: 82 BPM | DIASTOLIC BLOOD PRESSURE: 80 MMHG | SYSTOLIC BLOOD PRESSURE: 112 MMHG | HEIGHT: 62 IN

## 2024-10-23 DIAGNOSIS — Z12.11 SCREENING FOR MALIGNANT NEOPLASM OF COLON: ICD-10-CM

## 2024-10-23 DIAGNOSIS — Z12.31 ENCOUNTER FOR SCREENING MAMMOGRAM FOR MALIGNANT NEOPLASM OF BREAST: ICD-10-CM

## 2024-10-23 DIAGNOSIS — Z00.00 WELL ADULT EXAM: Primary | ICD-10-CM

## 2024-10-23 DIAGNOSIS — Z12.4 SCREENING FOR CERVICAL CANCER: ICD-10-CM

## 2024-10-23 DIAGNOSIS — F41.9 ANXIETY: ICD-10-CM

## 2024-10-23 DIAGNOSIS — Z71.6 ENCOUNTER FOR TOBACCO USE CESSATION COUNSELING: ICD-10-CM

## 2024-10-23 DIAGNOSIS — F17.200 TOBACCO DEPENDENCE: ICD-10-CM

## 2024-10-23 DIAGNOSIS — Z00.00 WELL ADULT EXAM: ICD-10-CM

## 2024-10-23 LAB
ALBUMIN SERPL-MCNC: 4.5 G/DL (ref 3.2–4.8)
ALBUMIN/GLOB SERPL: 1.7 {RATIO} (ref 1–2)
ALP LIVER SERPL-CCNC: 42 U/L
ALT SERPL-CCNC: <7 U/L
ANION GAP SERPL CALC-SCNC: 3 MMOL/L (ref 0–18)
AST SERPL-CCNC: 13 U/L (ref ?–34)
BASOPHILS # BLD AUTO: 0.07 X10(3) UL (ref 0–0.2)
BASOPHILS NFR BLD AUTO: 0.9 %
BILIRUB SERPL-MCNC: 0.5 MG/DL (ref 0.3–1.2)
BUN BLD-MCNC: 10 MG/DL (ref 9–23)
BUN/CREAT SERPL: 15.9 (ref 10–20)
CALCIUM BLD-MCNC: 9.4 MG/DL (ref 8.7–10.4)
CHLORIDE SERPL-SCNC: 108 MMOL/L (ref 98–112)
CHOLEST SERPL-MCNC: 197 MG/DL (ref ?–200)
CO2 SERPL-SCNC: 29 MMOL/L (ref 21–32)
CREAT BLD-MCNC: 0.63 MG/DL
DEPRECATED RDW RBC AUTO: 39.4 FL (ref 35.1–46.3)
EGFRCR SERPLBLD CKD-EPI 2021: 111 ML/MIN/1.73M2 (ref 60–?)
EOSINOPHIL # BLD AUTO: 0.35 X10(3) UL (ref 0–0.7)
EOSINOPHIL NFR BLD AUTO: 4.3 %
ERYTHROCYTE [DISTWIDTH] IN BLOOD BY AUTOMATED COUNT: 12.3 % (ref 11–15)
FASTING PATIENT LIPID ANSWER: YES
FASTING STATUS PATIENT QL REPORTED: YES
GLOBULIN PLAS-MCNC: 2.7 G/DL (ref 2–3.5)
GLUCOSE BLD-MCNC: 91 MG/DL (ref 70–99)
HCT VFR BLD AUTO: 36.2 %
HDLC SERPL-MCNC: 53 MG/DL (ref 40–59)
HGB BLD-MCNC: 12.4 G/DL
IMM GRANULOCYTES # BLD AUTO: 0.01 X10(3) UL (ref 0–1)
IMM GRANULOCYTES NFR BLD: 0.1 %
LDLC SERPL CALC-MCNC: 119 MG/DL (ref ?–100)
LYMPHOCYTES # BLD AUTO: 2.88 X10(3) UL (ref 1–4)
LYMPHOCYTES NFR BLD AUTO: 35.5 %
MCH RBC QN AUTO: 30.2 PG (ref 26–34)
MCHC RBC AUTO-ENTMCNC: 34.3 G/DL (ref 31–37)
MCV RBC AUTO: 88.1 FL
MONOCYTES # BLD AUTO: 0.59 X10(3) UL (ref 0.1–1)
MONOCYTES NFR BLD AUTO: 7.3 %
NEUTROPHILS # BLD AUTO: 4.21 X10 (3) UL (ref 1.5–7.7)
NEUTROPHILS # BLD AUTO: 4.21 X10(3) UL (ref 1.5–7.7)
NEUTROPHILS NFR BLD AUTO: 51.9 %
NONHDLC SERPL-MCNC: 144 MG/DL (ref ?–130)
OSMOLALITY SERPL CALC.SUM OF ELEC: 289 MOSM/KG (ref 275–295)
PLATELET # BLD AUTO: 287 10(3)UL (ref 150–450)
POTASSIUM SERPL-SCNC: 4.3 MMOL/L (ref 3.5–5.1)
PROT SERPL-MCNC: 7.2 G/DL (ref 5.7–8.2)
RBC # BLD AUTO: 4.11 X10(6)UL
SODIUM SERPL-SCNC: 140 MMOL/L (ref 136–145)
TRIGL SERPL-MCNC: 139 MG/DL (ref 30–149)
TSI SER-ACNC: 2.08 MIU/ML (ref 0.55–4.78)
VLDLC SERPL CALC-MCNC: 24 MG/DL (ref 0–30)
WBC # BLD AUTO: 8.1 X10(3) UL (ref 4–11)

## 2024-10-23 PROCEDURE — 36415 COLL VENOUS BLD VENIPUNCTURE: CPT

## 2024-10-23 PROCEDURE — 85025 COMPLETE CBC W/AUTO DIFF WBC: CPT

## 2024-10-23 PROCEDURE — 80053 COMPREHEN METABOLIC PANEL: CPT

## 2024-10-23 PROCEDURE — 99396 PREV VISIT EST AGE 40-64: CPT | Performed by: NURSE PRACTITIONER

## 2024-10-23 PROCEDURE — 80061 LIPID PANEL: CPT

## 2024-10-23 PROCEDURE — 84443 ASSAY THYROID STIM HORMONE: CPT

## 2024-10-23 NOTE — PROGRESS NOTES
HPI  Patient presents for annual physical.  Positive for past medical history; anxiety.      Last pap - 2019.    LMP - 10/13/2024.    Mammogram - 2022, normal.    Colonoscopy - consult ordered.    Diet - diet is healthy.   Exercise - exercises regularly.      Review of Systems   All other systems reviewed and are negative.       Vitals:    10/23/24 0712   BP: 112/80   Pulse: 82   Weight: 137 lb 6 oz (62.3 kg)   Height: 5' 2\" (1.575 m)     Body mass index is 25.13 kg/m².    Health Maintenance   Topic Date Due   • Colorectal Cancer Screening  Never done   • Pneumococcal Vaccine: Birth to 62yrs (1 of 2 - PCV) Never done   • Pap Smear  07/31/2022   • Mammogram  04/20/2023   • Tobacco Cessation Counseling  Never done   • COVID-19 Vaccine (1 - 2023-24 season) Never done   • Influenza Vaccine (1) Never done   • Annual Physical  10/23/2025   • DTaP,Tdap,and Td Vaccines (2 - Td or Tdap) 07/31/2029   • Annual Depression Screening  Completed       Patient's last menstrual period was 10/13/2024.    Past Medical History:   • Abnormal Papanicolaou smear of cervix with positive human papilloma virus (HPV) test    colposcopy done in 2016       .  Past Surgical History:   Procedure Laterality Date   • Colposcopy,bx cervix/endocerv curr  2016   • Design custom breast implant  2009       Family History   Problem Relation Age of Onset   • Stroke Father    • Breast Cancer Mother 72   • Other (Other) Mother    • Heart Disorder Maternal Grandmother    • Other (Other) Maternal Grandfather        Social History     Socioeconomic History   • Marital status:      Spouse name: Not on file   • Number of children: Not on file   • Years of education: Not on file   • Highest education level: Not on file   Occupational History   • Not on file   Tobacco Use   • Smoking status: Every Day     Current packs/day: 0.50     Types: Cigarettes   • Smokeless tobacco: Never   • Tobacco comments:     socially   Vaping Use   • Vaping status: Never Used    Substance and Sexual Activity   • Alcohol use: Yes     Alcohol/week: 1.0 - 2.0 standard drink of alcohol     Types: 1 - 2 Glasses of wine per week   • Drug use: Never   • Sexual activity: Not on file   Other Topics Concern   • Not on file   Social History Narrative   • Not on file     Social Drivers of Health     Financial Resource Strain: Not on file   Food Insecurity: Not on file   Transportation Needs: Not on file   Physical Activity: Not on file   Stress: Not on file   Social Connections: Not on file   Housing Stability: Not on file       Current Outpatient Medications   Medication Sig Dispense Refill   • sertraline 100 MG Oral Tab Take 1 tablet (100 mg total) by mouth daily. **120 day refill given on 10/04/24, please address all refills at your upcoming appointment. 90 tablet 0   • clonazePAM 0.5 MG Oral Tab Take 1 tablet (0.5 mg total) by mouth nightly as needed for Anxiety. 30 tablet 0   • valACYclovir 1 G Oral Tab Take one tablet now and another in 12 hours with cold sore flare up. 4 tablet 1       Allergies:  Allergies[1]    Physical Exam  Vitals and nursing note reviewed.   Constitutional:       General: She is not in acute distress.     Appearance: Normal appearance. She is well-developed. She is not ill-appearing, toxic-appearing or diaphoretic.   HENT:      Head: Normocephalic and atraumatic.      Right Ear: Hearing, tympanic membrane, ear canal and external ear normal. There is no impacted cerumen.      Left Ear: Hearing, tympanic membrane, ear canal and external ear normal. There is no impacted cerumen.      Nose: Nose normal. No congestion or rhinorrhea.      Mouth/Throat:      Mouth: Mucous membranes are moist.      Pharynx: Oropharynx is clear. No oropharyngeal exudate or posterior oropharyngeal erythema.   Eyes:      General:         Right eye: No discharge.         Left eye: No discharge.      Conjunctiva/sclera: Conjunctivae normal.   Neck:      Thyroid: No thyromegaly.   Cardiovascular:       Rate and Rhythm: Normal rate and regular rhythm.      Pulses: Normal pulses.      Heart sounds: Normal heart sounds. No murmur heard.  Pulmonary:      Effort: Pulmonary effort is normal. No respiratory distress.      Breath sounds: Normal breath sounds. No stridor. No wheezing, rhonchi or rales.   Chest:      Chest wall: No tenderness.   Breasts:     Right: Normal.      Left: Normal.   Abdominal:      General: Abdomen is flat. Bowel sounds are normal. There is no distension.      Palpations: Abdomen is soft. There is no mass.      Tenderness: There is no abdominal tenderness. There is no guarding or rebound.      Hernia: No hernia is present.   Genitourinary:     Exam position: Lithotomy position.      Labia:         Right: No rash, tenderness, lesion or injury.         Left: No rash, tenderness, lesion or injury.    Musculoskeletal:         General: Normal range of motion.      Cervical back: Normal range of motion and neck supple.   Lymphadenopathy:      Cervical: No cervical adenopathy.   Skin:     General: Skin is warm and dry.   Neurological:      Mental Status: She is alert and oriented to person, place, and time.   Psychiatric:         Mood and Affect: Mood normal.         Behavior: Behavior normal.         Thought Content: Thought content normal.         Judgment: Judgment normal.        Assessment and Plan:  Problem List Items Addressed This Visit          Mental Health    Anxiety       Genitourinary and Reproductive    Screening for breast cancer    Relevant Orders    [facility] 2D+3D SCREENING BILAT (CPT=77067/00635)     Other Visit Diagnoses       Well adult exam    -  Primary    Relevant Orders    ThinPrep PAP Smear    Hpv Dna  High Risk , Thin Prep Collect    Gastro GI Telephone Colon Screen    Lipid Panel    Comp Metabolic Panel (14)    CBC With Differential With Platelet    TSH W Reflex To Free T4    [facility] 2D+3D SCREENING BILAT (CPT=77067/34630)    Screening for cervical cancer        Relevant  Orders    ThinPrep PAP Smear    Hpv Dna  High Risk , Thin Prep Collect    Screening for malignant neoplasm of colon        Relevant Orders    Gastro GI Telephone Colon Screen    Tobacco dependence        Relevant Orders    Tobacco Cessation Discussion (Completed)    Encounter for tobacco use cessation counseling        Relevant Orders    Tobacco Cessation Discussion (Completed)           Fasting labs , pap today.  Referral to gastro for consult for colonoscopy.  Order placed for mammogram.  Smoking cessation counseling completed in the office today.     Discussed plan of care with patient and patient is in agreement.  All questions answered. Patient to call with questions or concerns.    Encouraged to sign up for My Chart if not already registered.          [1] No Known Allergies

## 2024-10-24 LAB — HPV E6+E7 MRNA CVX QL NAA+PROBE: NEGATIVE

## 2024-11-13 ENCOUNTER — HOSPITAL ENCOUNTER (OUTPATIENT)
Dept: MAMMOGRAPHY | Age: 44
Discharge: HOME | End: 2024-11-13
Attending: NURSE PRACTITIONER
Payer: COMMERCIAL

## 2024-11-13 DIAGNOSIS — Z00.00 WELL ADULT EXAM: ICD-10-CM

## 2024-11-13 DIAGNOSIS — Z12.31 ENCOUNTER FOR SCREENING MAMMOGRAM FOR MALIGNANT NEOPLASM OF BREAST: ICD-10-CM

## 2024-11-13 PROCEDURE — 77063 BREAST TOMOSYNTHESIS BI: CPT | Performed by: NURSE PRACTITIONER

## 2024-11-13 PROCEDURE — 77067 SCR MAMMO BI INCL CAD: CPT | Performed by: NURSE PRACTITIONER

## 2025-01-07 ENCOUNTER — NURSE ONLY (OUTPATIENT)
Facility: CLINIC | Age: 45
End: 2025-01-07

## 2025-01-12 DIAGNOSIS — F41.9 ANXIETY: ICD-10-CM

## 2025-01-16 NOTE — TELEPHONE ENCOUNTER
Please review;  [facility] protocol failed/ No protocol     Last office visit = 10/23/2024   Last refill = 8/1, 10/4/2024      Requested Prescriptions   Pending Prescriptions Disp Refills    CLONAZEPAM 0.5 MG Oral Tab [Pharmacy Med Name: clonazePAM 0.5 MG Oral Tablet] 30 tablet 0     Sig: TAKE 1 TABLET BY MOUTH NIGHTLY AS NEEDED FOR ANXIETY       Controlled Substance Medication Failed - 1/16/2025 11:38 AM        Failed - This medication is a controlled substance - forward to provider to refill        Passed - Medication is active on med list           Future Appointments         Provider Department Appt Notes    In 3 weeks [facility]          Recent [facility]patient Visits              1 week ago     [facility]    Nurse Only    2 months ago Well adult exam    [facility], [address], Tory Avina APRN    Office Visit    1 year ago Anxiety    [facility], [address] APRN    Office Visit    2 years ago Anxiety    [facility] [address]    Office Visit    3 years ago Anxiety    [facility], [address] APR[facility]    Office Visit

## 2025-01-18 RX ORDER — CLONAZEPAM 0.5 MG/1
0.5 TABLET ORAL NIGHTLY PRN
Qty: 30 TABLET | Refills: 0 | Status: SHIPPED | OUTPATIENT
Start: 2025-01-18

## 2025-03-09 DIAGNOSIS — F41.9 ANXIETY: ICD-10-CM

## 2025-03-12 DIAGNOSIS — F41.9 ANXIETY: ICD-10-CM

## 2025-03-12 RX ORDER — CLONAZEPAM 0.5 MG/1
0.5 TABLET ORAL NIGHTLY PRN
Qty: 30 TABLET | Refills: 0 | Status: SHIPPED | OUTPATIENT
Start: 2025-03-12

## 2025-03-12 NOTE — TELEPHONE ENCOUNTER
Please review. Protocol Failed; No Protocol      Recent fills: 10/4/2024, 1/18/2025  Last Rx written: 1/18/2025  Last office visit: 10/23/2024

## 2025-03-14 RX ORDER — SERTRALINE HYDROCHLORIDE 100 MG/1
100 TABLET, FILM COATED ORAL DAILY
Qty: 90 TABLET | Refills: 3 | Status: SHIPPED | OUTPATIENT
Start: 2025-03-14

## 2025-03-14 NOTE — TELEPHONE ENCOUNTER
Refill Per Protocol     Requested Prescriptions   Pending Prescriptions Disp Refills    SERTRALINE 100 MG Oral Tab [Pharmacy Med Name: Sertraline HCl 100 MG Oral Tablet] 90 tablet 0     Sig: TAKE 1 TABLET (100 MG TOTAL) BY MOUTH ONCE DAILY       Psychiatric Non-Scheduled (Anti-Anxiety) Passed - 3/14/2025  8:39 AM        Passed - In person appointment or virtual visit in the past 6 mos or appointment in next 3 mos     Recent Outpatient Visits              2 months ago     [facility], [facility]    Nurse Only    4 months ago Well adult exam    [facility], [address], Tory Avina APRN    Office Visit    1 year ago Anxiety    [facility], [address], APRN    Office Visit    2 years ago Anxiety    [facility], [address] APRN    Office Visit    3 years ago Anxiety    [facility], [address] APR[facility]    Office Visit                      Passed - Depression Screening completed within the past 12 months        Passed - Medication is active on med list

## 2025-04-19 DIAGNOSIS — B00.1 HERPES LABIALIS: ICD-10-CM

## 2025-04-19 DIAGNOSIS — F41.9 ANXIETY: ICD-10-CM

## 2025-04-23 RX ORDER — CLONAZEPAM 0.5 MG/1
0.5 TABLET ORAL NIGHTLY PRN
Qty: 30 TABLET | Refills: 0 | Status: SHIPPED | OUTPATIENT
Start: 2025-04-23

## 2025-04-23 RX ORDER — VALACYCLOVIR HYDROCHLORIDE 1 G/1
TABLET, FILM COATED ORAL
Qty: 4 TABLET | Refills: 1 | Status: SHIPPED | OUTPATIENT
Start: 2025-04-23

## 2025-04-23 NOTE — TELEPHONE ENCOUNTER
Refill passed per Clinic protocol.  Requested Prescriptions   Pending Prescriptions Disp Refills    clonazePAM 0.5 MG Oral Tab [Pharmacy Med Name: clonazePAM 0.5 MG Oral Tablet] 30 tablet 0     Sig: Take 1 tablet (0.5 mg total) by mouth nightly as needed for Anxiety.       Controlled Substance Medication Failed - 4/23/2025  8:43 AM        Failed - This medication is a controlled substance - forward to provider to refill        Passed - Medication is active on med list          valACYclovir 1 G Oral Tab [Pharmacy Med Name: valACYclovir HCl 1 GM Oral Tablet] 4 tablet 0     Sig: Take one tablet now and another in 12 hours with cold sore flare up       Herpes Agent Protocol Passed - 4/23/2025  8:43 AM        Passed - In person appointment or virtual visit in the past 12 mos or appointment in next 3 mos     Recent Outpatient Visits              3 months ago     [facility], [facility] [place]    Nurse Only    6 months ago Well adult exam    [facility], [address], Tory Avina APRN    Office Visit    1 year ago Anxiety    [facility], [address] APR[facility]    [facility]    [facility]    [facility]    3 years ago Anxiety    [facility] [address]    Office Visit                      Passed - Medication is active on med list

## 2025-04-23 NOTE — TELEPHONE ENCOUNTER
Please review; protocol failed/No Protocol      Recent Fills: 01/18/2025, 03/13/2025 #30 for 30 day supply     Last Rx Written: 03/13/2025    Last Office Visit: 10/23/2024

## 2025-08-03 ENCOUNTER — OFFICE VISIT (OUTPATIENT)
Dept: FAMILY MEDICINE CLINIC | Facility: CLINIC | Age: 45
End: 2025-08-03

## 2025-08-03 VITALS
WEIGHT: 133.81 LBS | SYSTOLIC BLOOD PRESSURE: 126 MMHG | HEIGHT: 62 IN | HEART RATE: 83 BPM | DIASTOLIC BLOOD PRESSURE: 82 MMHG | BODY MASS INDEX: 24.63 KG/M2

## 2025-08-03 DIAGNOSIS — N92.0 MENORRHAGIA WITH REGULAR CYCLE: Primary | ICD-10-CM

## 2025-08-03 DIAGNOSIS — N95.1 PERIMENOPAUSE: ICD-10-CM

## 2025-08-03 DIAGNOSIS — F41.9 ANXIETY: ICD-10-CM

## 2025-08-03 PROCEDURE — 99213 OFFICE O/P EST LOW 20 MIN: CPT | Performed by: NURSE PRACTITIONER

## 2025-08-03 RX ORDER — CLONAZEPAM 0.5 MG/1
0.5 TABLET ORAL NIGHTLY PRN
Qty: 30 TABLET | Refills: 0 | Status: SHIPPED | OUTPATIENT
Start: 2025-08-03

## 2025-08-03 RX ORDER — BUPROPION HYDROCHLORIDE 150 MG/1
150 TABLET ORAL DAILY
Qty: 30 TABLET | Refills: 0 | Status: SHIPPED | OUTPATIENT
Start: 2025-08-03

## (undated) DIAGNOSIS — B00.1 HERPES LABIALIS: ICD-10-CM

## (undated) DIAGNOSIS — F41.9 ANXIETY: ICD-10-CM

## (undated) NOTE — LETTER
09/04/19        [address] [facility]      Dear Salma Bermudez records indicate that you have outstanding lab work and or testing that was ordered for you and has not yet been completed:  Orders Placed This Encount

## (undated) NOTE — LETTER
06/04/20        [address] [facility]      Dear Ela Durham records indicate that you have outstanding lab work and or testing that was ordered for you and has not yet been completed:  Orders Placed This Encount

## (undated) NOTE — ED AVS SNAPSHOT
Mj Boone   MRN: [id number]    Department:  [facility] Emergency Department   Date of Visit:  3/23/2018           Disclosure     Insurance plans vary and the physician(s) referred by the ER may not be covered by your plan.  Please contac CARE PHYSICIAN AT ONCE OR RETURN IMMEDIATELY TO THE EMERGENCY DEPARTMENT. If you have been prescribed any medication(s), please fill your prescription right away and begin taking the medication(s) as directed.   If you believe that any of the medications

## (undated) NOTE — LETTER
10/25/21        [id number]      Dear Hayde Alcantar records indicate that you have outstanding lab work and or testing that was ordered for you and has not yet been completed:  Orders Placed This Encount

## (undated) NOTE — LETTER
07/12/21        [address] [facility]      Dear Lucille Gore records indicate that you have outstanding lab work and or testing that was ordered for you and has not yet been completed:  Orders Placed This Encount